# Patient Record
Sex: MALE | Race: WHITE
[De-identification: names, ages, dates, MRNs, and addresses within clinical notes are randomized per-mention and may not be internally consistent; named-entity substitution may affect disease eponyms.]

---

## 2018-08-27 ENCOUNTER — HOSPITAL ENCOUNTER (EMERGENCY)
Dept: HOSPITAL 56 - MW.ED | Age: 28
Discharge: HOME | End: 2018-08-27
Payer: COMMERCIAL

## 2018-08-27 DIAGNOSIS — J02.0: Primary | ICD-10-CM

## 2018-08-27 NOTE — EDM.PDOC
ED HPI GENERAL MEDICAL PROBLEM





- General


Chief Complaint: ENT Problem


Stated Complaint: LEFT EAR AND SORE THROAT


Time Seen by Provider: 08/27/18 21:58


Source of Information: Reports: Patient


History Limitations: Reports: No Limitations





- History of Present Illness


INITIAL COMMENTS - FREE TEXT/NARRATIVE: 





HISTORY AND PHYSICAL:





History of present illness:


28-year-old male presenting emergency department with 4-5 days of sore throat 

and left ear pain.





Patient states that 4-5 days ago he began a mild sore throat. This has 

progressed to the point where now his left ear as well as neck hurts. States it 

feels like sandpaper when he swallows. Initially thought it was possibly due to 

allergies however things have not improved. Denies any associated fevers, chills

, abdominal pain, diarrhea. He has had some mild nausea without vomiting. 

Denies any medical allergies and takes no normal medications and is generally 

healthy. 





On exam bilateral tonsils are erythematous and enlarged with exudate. Patient 

is tender to palpation of left submandibular wells anterior cervical lymph 

nodes. Mastoid is nontender to palpation.











Review of systems: 


As per history of present illness and below otherwise all systems reviewed and 

negative.





Past medical history: 


As per history of present illness and as reviewed below otherwise 

noncontributory.





Surgical history: 


As per history of present illness and as reviewed below otherwise 

noncontributory.





Social history: 


No reported history of drug or alcohol abuse.





Family history: 


As per history of present illness and as reviewed below otherwise 

noncontributory.





Physical exam:


HEENT: See above Atraumatic, normocephalic, pupils reactive, negative for 

conjunctival pallor or scleral icterus, mucous membranes moist, neck supple, 

trachea midline.


Lungs: Clear to auscultation, breath sounds equal bilaterally, chest nontender.


Heart: S1S2, regular, negative for clicks, rubs, or JVD.


Abdomen: Soft, nondistended, nontender. Negative for masses or 

hepatosplenomegaly. Negative for costovertebral tenderness.


Pelvis: Stable nontender.


Genitourinary: Deferred.


Rectal: Deferred.


Extremities: Atraumatic, negative for cords or calf pain. Neurovascular 

unremarkable.


Neuro: Awake, alert, oriented. Cranial nerves II through XII unremarkable. 

Cerebellum unremarkable. Motor and sensory unremarkable throughout. Exam 

nonfocal.





Diagnostics:


Rapid strep 





Therapeutics:


Penicillin V 500 mg by mouth twice a day 10 days 





Impression: 


Strep pharyngitis/tonsillitis 





Plan:


On exam patient was tender to palpation of left submandibular as well as 

anterior cervical lymph nodes. Tonsils were erythematous and enlarged. Patient 

had symptoms for 4-5 days most suggestive of a strep pharyngitis. He was given 

a prescription for penicillin V 500 mg by mouth twice a day and told to follow-

up with a primary care provider. He should return to the emergency department 

if he has any new or worsening symptoms.








Definitive disposition and diagnosis as appropriate pending reevaluation and 

review of above.








  ** L Ear/Jaw


Pain Score (Numeric/FACES): 9





- Related Data


 Allergies











Allergy/AdvReac Type Severity Reaction Status Date / Time


 


No Known Allergies Allergy   Verified 08/27/18 22:18











Home Meds: 


 Home Meds





. [No Known Home Meds]  08/27/18 [History]











ED ROS GENERAL





- Review of Systems


Review Of Systems: ROS reveals no pertinent complaints other than HPI.





ED EXAM, GENERAL





- Physical Exam


Exam: See Below





Course





- Vital Signs


Last Recorded V/S: 


 Last Vital Signs











Temp  97.9 F   08/27/18 22:16


 


Pulse  93   08/27/18 22:16


 


Resp  14   08/27/18 22:16


 


BP  136/85   08/27/18 22:16


 


Pulse Ox  97   08/27/18 22:16














- Orders/Labs/Meds


Orders: 


 Active Orders 24 hr











 Category Date Time Status


 


 STREP SCRN A RAPID W CULT CONF [RM] Stat Lab  08/27/18 22:39 Received














Departure





- Departure


Time of Disposition: 22:54


Disposition: Home, Self-Care 01


Condition: Good


Clinical Impression: 


 Strep pharyngitis








- Discharge Information


Referrals: 


PCP,None [Primary Care Provider] - 


Forms:  ED Department Discharge


Additional Instructions: 


My general discharge


The following information is given to patients seen in the emergency department 

who are being discharged to home. This information is to outline your options 

for follow-up care. We provide all patients seen in our emergency department 

with a follow-up referral.





The need for follow-up, as well as the timing and circumstances, are variable 

depending upon the specifics of your emergency department visit.





If you don't have a primary care physician on staff, we will provide you with a 

referral. We always advise you to contact your personal physician following an 

emergency department visit to inform them of the circumstance of the visit and 

for follow-up with them and/or the need for any referrals to a consulting 

specialist.





The emergency department will also refer you to a specialist when appropriate. 

This referral assures that you have the opportunity for follow-up care with a 

specialist. All of these measure are taken in an effort to provide you with 

optimal care, which includes your follow-up.





Under all circumstances we always encourage you to contact your private 

physician who remains a resource for coordinating your care. When calling for 

follow-up care, please make the office aware that this follow-up is from your 

recent emergency room visit. If for any reason you are refused follow-up, 

please contact the [facility] Emergency 

Department at [phone] and asked to speak to the emergency department 

charge nurse.





[facility]


Primary Care


[address] [place] [id number]


Phone: [phone]


Fax: [phone]





Follow-up with primary care provider.


Return to emergency department if any new or worsening symptoms


Take medication as prescribed.





- My Orders


Last 24 Hours: 


My Active Orders





08/27/18 22:39


STREP SCRN A RAPID W CULT CONF [RM] Stat 














- Assessment/Plan


Last 24 Hours: 


My Active Orders





08/27/18 22:39


STREP SCRN A RAPID W CULT CONF [RM] Stat